# Patient Record
Sex: FEMALE | Race: WHITE | NOT HISPANIC OR LATINO
[De-identification: names, ages, dates, MRNs, and addresses within clinical notes are randomized per-mention and may not be internally consistent; named-entity substitution may affect disease eponyms.]

---

## 2017-07-18 ENCOUNTER — APPOINTMENT (OUTPATIENT)
Dept: ORTHOPEDIC SURGERY | Facility: CLINIC | Age: 37
End: 2017-07-18

## 2017-07-19 ENCOUNTER — TRANSCRIPTION ENCOUNTER (OUTPATIENT)
Age: 37
End: 2017-07-19

## 2017-07-27 ENCOUNTER — TRANSCRIPTION ENCOUNTER (OUTPATIENT)
Age: 37
End: 2017-07-27

## 2017-08-11 ENCOUNTER — CLINICAL ADVICE (OUTPATIENT)
Age: 37
End: 2017-08-11

## 2017-08-11 ENCOUNTER — APPOINTMENT (OUTPATIENT)
Dept: ORTHOPEDIC SURGERY | Facility: CLINIC | Age: 37
End: 2017-08-11

## 2017-08-23 ENCOUNTER — TRANSCRIPTION ENCOUNTER (OUTPATIENT)
Age: 37
End: 2017-08-23

## 2017-08-29 ENCOUNTER — APPOINTMENT (OUTPATIENT)
Dept: ORTHOPEDIC SURGERY | Facility: CLINIC | Age: 37
End: 2017-08-29
Payer: COMMERCIAL

## 2017-08-29 PROCEDURE — 20605 DRAIN/INJ JOINT/BURSA W/O US: CPT | Mod: F5

## 2017-08-29 PROCEDURE — 99214 OFFICE O/P EST MOD 30 MIN: CPT | Mod: 25

## 2017-08-29 PROCEDURE — 73140 X-RAY EXAM OF FINGER(S): CPT | Mod: F5

## 2017-12-07 ENCOUNTER — APPOINTMENT (OUTPATIENT)
Dept: ORTHOPEDIC SURGERY | Facility: CLINIC | Age: 37
End: 2017-12-07
Payer: COMMERCIAL

## 2017-12-07 DIAGNOSIS — T14.8XXA OTHER INJURY OF UNSPECIFIED BODY REGION, INITIAL ENCOUNTER: ICD-10-CM

## 2017-12-07 PROCEDURE — 99214 OFFICE O/P EST MOD 30 MIN: CPT

## 2018-02-13 ENCOUNTER — APPOINTMENT (OUTPATIENT)
Dept: ORTHOPEDIC SURGERY | Facility: CLINIC | Age: 38
End: 2018-02-13
Payer: COMMERCIAL

## 2018-02-13 VITALS — WEIGHT: 130 LBS | RESPIRATION RATE: 16 BRPM | HEIGHT: 62 IN | BODY MASS INDEX: 23.92 KG/M2

## 2018-02-13 PROCEDURE — 99214 OFFICE O/P EST MOD 30 MIN: CPT

## 2018-04-06 ENCOUNTER — RESULT REVIEW (OUTPATIENT)
Age: 38
End: 2018-04-06

## 2018-06-08 ENCOUNTER — OUTPATIENT (OUTPATIENT)
Dept: OUTPATIENT SERVICES | Facility: HOSPITAL | Age: 38
LOS: 1 days | End: 2018-06-08
Payer: COMMERCIAL

## 2018-06-08 DIAGNOSIS — O26.899 OTHER SPECIFIED PREGNANCY RELATED CONDITIONS, UNSPECIFIED TRIMESTER: ICD-10-CM

## 2018-06-08 DIAGNOSIS — Z3A.00 WEEKS OF GESTATION OF PREGNANCY NOT SPECIFIED: ICD-10-CM

## 2018-06-08 PROCEDURE — 76818 FETAL BIOPHYS PROFILE W/NST: CPT

## 2018-06-08 PROCEDURE — 99214 OFFICE O/P EST MOD 30 MIN: CPT

## 2018-06-11 DIAGNOSIS — O09.513 SUPERVISION OF ELDERLY PRIMIGRAVIDA, THIRD TRIMESTER: ICD-10-CM

## 2018-06-13 ENCOUNTER — OUTPATIENT (OUTPATIENT)
Dept: OUTPATIENT SERVICES | Facility: HOSPITAL | Age: 38
LOS: 1 days | End: 2018-06-13
Payer: COMMERCIAL

## 2018-06-13 DIAGNOSIS — Z01.818 ENCOUNTER FOR OTHER PREPROCEDURAL EXAMINATION: ICD-10-CM

## 2018-06-13 LAB
BLD GP AB SCN SERPL QL: NEGATIVE — SIGNIFICANT CHANGE UP
BLD GP AB SCN SERPL QL: NEGATIVE — SIGNIFICANT CHANGE UP
HCT VFR BLD CALC: 35.6 % — SIGNIFICANT CHANGE UP (ref 34.5–45)
HGB BLD-MCNC: 12.3 G/DL — SIGNIFICANT CHANGE UP (ref 11.5–15.5)
MCHC RBC-ENTMCNC: 30.8 PG — SIGNIFICANT CHANGE UP (ref 27–34)
MCHC RBC-ENTMCNC: 34.6 G/DL — SIGNIFICANT CHANGE UP (ref 32–36)
MCV RBC AUTO: 89 FL — SIGNIFICANT CHANGE UP (ref 80–100)
PLATELET # BLD AUTO: 189 K/UL — SIGNIFICANT CHANGE UP (ref 150–400)
RBC # BLD: 4 M/UL — SIGNIFICANT CHANGE UP (ref 3.8–5.2)
RBC # FLD: 13.4 % — SIGNIFICANT CHANGE UP (ref 10.3–16.9)
RH IG SCN BLD-IMP: POSITIVE — SIGNIFICANT CHANGE UP
RH IG SCN BLD-IMP: POSITIVE — SIGNIFICANT CHANGE UP
WBC # BLD: 12.4 K/UL — HIGH (ref 3.8–10.5)
WBC # FLD AUTO: 12.4 K/UL — HIGH (ref 3.8–10.5)

## 2018-06-13 PROCEDURE — 86901 BLOOD TYPING SEROLOGIC RH(D): CPT

## 2018-06-13 PROCEDURE — 86900 BLOOD TYPING SEROLOGIC ABO: CPT

## 2018-06-13 PROCEDURE — 86850 RBC ANTIBODY SCREEN: CPT

## 2018-06-13 PROCEDURE — 85027 COMPLETE CBC AUTOMATED: CPT

## 2018-06-13 PROCEDURE — 86780 TREPONEMA PALLIDUM: CPT

## 2018-06-14 ENCOUNTER — INPATIENT (INPATIENT)
Facility: HOSPITAL | Age: 38
LOS: 3 days | Discharge: HOME | End: 2018-06-18
Attending: OBSTETRICS & GYNECOLOGY | Admitting: OBSTETRICS & GYNECOLOGY
Payer: COMMERCIAL

## 2018-06-14 ENCOUNTER — RESULT REVIEW (OUTPATIENT)
Age: 38
End: 2018-06-14

## 2018-06-14 VITALS — HEIGHT: 63 IN | WEIGHT: 154.98 LBS

## 2018-06-14 LAB — T PALLIDUM AB TITR SER: NEGATIVE — SIGNIFICANT CHANGE UP

## 2018-06-14 RX ORDER — IBUPROFEN 200 MG
600 TABLET ORAL ONCE
Qty: 0 | Refills: 0 | Status: DISCONTINUED | OUTPATIENT
Start: 2018-06-14 | End: 2018-06-18

## 2018-06-14 RX ORDER — ACETAMINOPHEN 500 MG
650 TABLET ORAL EVERY 6 HOURS
Qty: 0 | Refills: 0 | Status: DISCONTINUED | OUTPATIENT
Start: 2018-06-14 | End: 2018-06-18

## 2018-06-14 RX ORDER — ACETAMINOPHEN 500 MG
650 TABLET ORAL EVERY 6 HOURS
Qty: 0 | Refills: 0 | Status: DISCONTINUED | OUTPATIENT
Start: 2018-06-14 | End: 2018-06-15

## 2018-06-14 RX ORDER — SODIUM CHLORIDE 9 MG/ML
1000 INJECTION, SOLUTION INTRAVENOUS
Qty: 0 | Refills: 0 | Status: DISCONTINUED | OUTPATIENT
Start: 2018-06-14 | End: 2018-06-14

## 2018-06-14 RX ORDER — NALOXONE HYDROCHLORIDE 4 MG/.1ML
0.1 SPRAY NASAL
Qty: 0 | Refills: 0 | Status: DISCONTINUED | OUTPATIENT
Start: 2018-06-14 | End: 2018-06-15

## 2018-06-14 RX ORDER — OXYCODONE AND ACETAMINOPHEN 5; 325 MG/1; MG/1
2 TABLET ORAL EVERY 6 HOURS
Qty: 0 | Refills: 0 | Status: DISCONTINUED | OUTPATIENT
Start: 2018-06-14 | End: 2018-06-18

## 2018-06-14 RX ORDER — OXYCODONE AND ACETAMINOPHEN 5; 325 MG/1; MG/1
1 TABLET ORAL
Qty: 0 | Refills: 0 | Status: DISCONTINUED | OUTPATIENT
Start: 2018-06-14 | End: 2018-06-18

## 2018-06-14 RX ORDER — SIMETHICONE 80 MG/1
80 TABLET, CHEWABLE ORAL EVERY 4 HOURS
Qty: 0 | Refills: 0 | Status: DISCONTINUED | OUTPATIENT
Start: 2018-06-14 | End: 2018-06-18

## 2018-06-14 RX ORDER — DIPHENHYDRAMINE HCL 50 MG
25 CAPSULE ORAL EVERY 6 HOURS
Qty: 0 | Refills: 0 | Status: DISCONTINUED | OUTPATIENT
Start: 2018-06-14 | End: 2018-06-15

## 2018-06-14 RX ORDER — OXYTOCIN 10 UNIT/ML
41.67 VIAL (ML) INJECTION
Qty: 20 | Refills: 0 | Status: DISCONTINUED | OUTPATIENT
Start: 2018-06-14 | End: 2018-06-14

## 2018-06-14 RX ORDER — METOCLOPRAMIDE HCL 10 MG
10 TABLET ORAL ONCE
Qty: 0 | Refills: 0 | Status: DISCONTINUED | OUTPATIENT
Start: 2018-06-14 | End: 2018-06-14

## 2018-06-14 RX ORDER — TETANUS TOXOID, REDUCED DIPHTHERIA TOXOID AND ACELLULAR PERTUSSIS VACCINE, ADSORBED 5; 2.5; 8; 8; 2.5 [IU]/.5ML; [IU]/.5ML; UG/.5ML; UG/.5ML; UG/.5ML
0.5 SUSPENSION INTRAMUSCULAR ONCE
Qty: 0 | Refills: 0 | Status: DISCONTINUED | OUTPATIENT
Start: 2018-06-14 | End: 2018-06-18

## 2018-06-14 RX ORDER — OXYTOCIN 10 UNIT/ML
41.67 VIAL (ML) INJECTION
Qty: 20 | Refills: 0 | Status: DISCONTINUED | OUTPATIENT
Start: 2018-06-14 | End: 2018-06-18

## 2018-06-14 RX ORDER — GLYCERIN ADULT
1 SUPPOSITORY, RECTAL RECTAL AT BEDTIME
Qty: 0 | Refills: 0 | Status: DISCONTINUED | OUTPATIENT
Start: 2018-06-14 | End: 2018-06-15

## 2018-06-14 RX ORDER — FERROUS SULFATE 325(65) MG
325 TABLET ORAL DAILY
Qty: 0 | Refills: 0 | Status: DISCONTINUED | OUTPATIENT
Start: 2018-06-14 | End: 2018-06-18

## 2018-06-14 RX ORDER — SODIUM CHLORIDE 9 MG/ML
1000 INJECTION, SOLUTION INTRAVENOUS
Qty: 0 | Refills: 0 | Status: DISCONTINUED | OUTPATIENT
Start: 2018-06-14 | End: 2018-06-15

## 2018-06-14 RX ORDER — IBUPROFEN 200 MG
600 TABLET ORAL EVERY 6 HOURS
Qty: 0 | Refills: 0 | Status: DISCONTINUED | OUTPATIENT
Start: 2018-06-14 | End: 2018-06-18

## 2018-06-14 RX ORDER — CITRIC ACID/SODIUM CITRATE 300-500 MG
30 SOLUTION, ORAL ORAL ONCE
Qty: 0 | Refills: 0 | Status: COMPLETED | OUTPATIENT
Start: 2018-06-14 | End: 2018-06-14

## 2018-06-14 RX ORDER — CEFAZOLIN SODIUM 1 G
2000 VIAL (EA) INJECTION ONCE
Qty: 0 | Refills: 0 | Status: COMPLETED | OUTPATIENT
Start: 2018-06-14 | End: 2018-06-14

## 2018-06-14 RX ORDER — SODIUM CHLORIDE 9 MG/ML
1000 INJECTION, SOLUTION INTRAVENOUS ONCE
Qty: 0 | Refills: 0 | Status: COMPLETED | OUTPATIENT
Start: 2018-06-14 | End: 2018-06-14

## 2018-06-14 RX ORDER — ONDANSETRON 8 MG/1
4 TABLET, FILM COATED ORAL EVERY 6 HOURS
Qty: 0 | Refills: 0 | Status: DISCONTINUED | OUTPATIENT
Start: 2018-06-14 | End: 2018-06-18

## 2018-06-14 RX ORDER — LANOLIN
1 OINTMENT (GRAM) TOPICAL
Qty: 0 | Refills: 0 | Status: DISCONTINUED | OUTPATIENT
Start: 2018-06-14 | End: 2018-06-18

## 2018-06-14 RX ORDER — DOCUSATE SODIUM 100 MG
100 CAPSULE ORAL
Qty: 0 | Refills: 0 | Status: DISCONTINUED | OUTPATIENT
Start: 2018-06-14 | End: 2018-06-18

## 2018-06-14 RX ORDER — OXYTOCIN 10 UNIT/ML
333.33 VIAL (ML) INJECTION
Qty: 20 | Refills: 0 | Status: COMPLETED | OUTPATIENT
Start: 2018-06-14 | End: 2018-06-14

## 2018-06-14 RX ADMIN — Medication 100 MILLIGRAM(S): at 10:09

## 2018-06-14 RX ADMIN — Medication 600 MILLIGRAM(S): at 18:23

## 2018-06-14 RX ADMIN — SODIUM CHLORIDE 125 MILLILITER(S): 9 INJECTION, SOLUTION INTRAVENOUS at 09:07

## 2018-06-14 RX ADMIN — Medication 125 MILLIUNIT(S)/MIN: at 12:43

## 2018-06-14 RX ADMIN — Medication 1000 MILLIUNIT(S)/MIN: at 12:43

## 2018-06-14 RX ADMIN — ONDANSETRON 4 MILLIGRAM(S): 8 TABLET, FILM COATED ORAL at 16:25

## 2018-06-14 RX ADMIN — Medication 125 MILLIUNIT(S)/MIN: at 13:18

## 2018-06-14 RX ADMIN — Medication 600 MILLIGRAM(S): at 20:15

## 2018-06-14 RX ADMIN — SODIUM CHLORIDE 2000 MILLILITER(S): 9 INJECTION, SOLUTION INTRAVENOUS at 08:30

## 2018-06-14 RX ADMIN — Medication 30 MILLILITER(S): at 10:09

## 2018-06-14 NOTE — PATIENT PROFILE OB - WEIGHT: TOTAL WEIGHT IN LBS
With dad, Al.  Was in hockey yesterday and either pulled his right knee or hyperextended it last night. He said it is bruised and hurts to straighten out but he is able to. He is taking Advil for it.    30

## 2018-06-15 LAB
BASOPHILS NFR BLD AUTO: 0.1 % — SIGNIFICANT CHANGE UP (ref 0–2)
EOSINOPHIL NFR BLD AUTO: 0.1 % — SIGNIFICANT CHANGE UP (ref 0–6)
HCT VFR BLD CALC: 28.3 % — LOW (ref 34.5–45)
HGB BLD-MCNC: 9.7 G/DL — LOW (ref 11.5–15.5)
LYMPHOCYTES # BLD AUTO: 12.5 % — LOW (ref 13–44)
MCHC RBC-ENTMCNC: 29.9 PG — SIGNIFICANT CHANGE UP (ref 27–34)
MCHC RBC-ENTMCNC: 34.3 G/DL — SIGNIFICANT CHANGE UP (ref 32–36)
MCV RBC AUTO: 87.3 FL — SIGNIFICANT CHANGE UP (ref 80–100)
MONOCYTES NFR BLD AUTO: 5.4 % — SIGNIFICANT CHANGE UP (ref 2–14)
NEUTROPHILS NFR BLD AUTO: 81.9 % — HIGH (ref 43–77)
PLATELET # BLD AUTO: 158 K/UL — SIGNIFICANT CHANGE UP (ref 150–400)
RBC # BLD: 3.24 M/UL — LOW (ref 3.8–5.2)
RBC # FLD: 13.1 % — SIGNIFICANT CHANGE UP (ref 10.3–16.9)
WBC # BLD: 19 K/UL — HIGH (ref 3.8–10.5)
WBC # FLD AUTO: 19 K/UL — HIGH (ref 3.8–10.5)

## 2018-06-15 RX ORDER — HEPARIN SODIUM 5000 [USP'U]/ML
5000 INJECTION INTRAVENOUS; SUBCUTANEOUS EVERY 12 HOURS
Qty: 0 | Refills: 0 | Status: DISCONTINUED | OUTPATIENT
Start: 2018-06-15 | End: 2018-06-18

## 2018-06-15 RX ADMIN — Medication 600 MILLIGRAM(S): at 06:30

## 2018-06-15 RX ADMIN — HEPARIN SODIUM 5000 UNIT(S): 5000 INJECTION INTRAVENOUS; SUBCUTANEOUS at 18:15

## 2018-06-15 RX ADMIN — SIMETHICONE 80 MILLIGRAM(S): 80 TABLET, CHEWABLE ORAL at 23:57

## 2018-06-15 RX ADMIN — Medication 100 MILLIGRAM(S): at 06:30

## 2018-06-15 RX ADMIN — Medication 600 MILLIGRAM(S): at 23:58

## 2018-06-15 RX ADMIN — Medication 600 MILLIGRAM(S): at 18:15

## 2018-06-15 RX ADMIN — HEPARIN SODIUM 5000 UNIT(S): 5000 INJECTION INTRAVENOUS; SUBCUTANEOUS at 06:30

## 2018-06-15 RX ADMIN — Medication 600 MILLIGRAM(S): at 01:20

## 2018-06-15 RX ADMIN — Medication 600 MILLIGRAM(S): at 13:15

## 2018-06-15 RX ADMIN — Medication 325 MILLIGRAM(S): at 12:53

## 2018-06-15 RX ADMIN — SIMETHICONE 80 MILLIGRAM(S): 80 TABLET, CHEWABLE ORAL at 08:05

## 2018-06-15 RX ADMIN — Medication 600 MILLIGRAM(S): at 00:27

## 2018-06-15 RX ADMIN — Medication 1 TABLET(S): at 12:53

## 2018-06-15 RX ADMIN — Medication 650 MILLIGRAM(S): at 21:46

## 2018-06-15 RX ADMIN — SIMETHICONE 80 MILLIGRAM(S): 80 TABLET, CHEWABLE ORAL at 18:15

## 2018-06-15 RX ADMIN — Medication 600 MILLIGRAM(S): at 18:45

## 2018-06-15 RX ADMIN — SIMETHICONE 80 MILLIGRAM(S): 80 TABLET, CHEWABLE ORAL at 00:28

## 2018-06-15 RX ADMIN — Medication 650 MILLIGRAM(S): at 22:30

## 2018-06-15 RX ADMIN — Medication 600 MILLIGRAM(S): at 07:36

## 2018-06-15 RX ADMIN — Medication 600 MILLIGRAM(S): at 12:53

## 2018-06-15 RX ADMIN — Medication 100 MILLIGRAM(S): at 18:14

## 2018-06-15 NOTE — PROGRESS NOTE ADULT - SUBJECTIVE AND OBJECTIVE BOX
Patient evaluated at bedside.   She reports pain is well controlled.  She denies nausea, vomiting or heavy vaginal bleeding.  She has not been OOB, wood catheter is in place, however she is passing gas, tolerating clear diet and is breastfeeding.    Physical Exam:  Vital Signs Last 24 Hrs  T(C): 36.4 (15 Dominic 2018 06:29), Max: 37.1 (15 Dominic 2018 02:07)  T(F): 97.6 (15 Dominic 2018 06:29), Max: 98.7 (15 Dominic 2018 02:07)  HR: 75 (15 Dominic 2018 06:29) (66 - 87)  BP: 113/55 (15 Dominic 2018 06:29) (104/57 - 129/78)  BP(mean): --  RR: 18 (15 Dominic 2018 06:29) (18 - 20)  SpO2: 96% (15 Dominic 2018 06:29) (96% - 99%)    GA: NAD, A+0 x 3  Abd: + BS, soft, nontender, nondistended, no rebound or guarding, incision clean, dry and intact, pressure dressing removed, steristrips in place, uterus firm at midline, 3 fb below umbilicus  : lochia WNL  Wood: clear urine with adequate output  Extremities: no swelling or calf tenderness                            9.7    19.0  )-----------( 158      ( 15 Dominic 2018 06:11 )             28.3

## 2018-06-15 NOTE — PROGRESS NOTE ADULT - ASSESSMENT
A/P   37y  s/p c/s, POD #1, stable  -  Pain: PO motrin, percocet  -  GI: tolerating clears, passing gas, ADAT  -  : wood in situ; DC this AM, f/u TOV  -  DVT prophylaxis: ambulation, SCDs  -  Dispo: POD 3 or 4

## 2018-06-15 NOTE — LACTATION INITIAL EVALUATION - REQUESTED BY
lactation rounds, initial visit.  delivered @ 395/7 weeks gestation via c/s. The  has had a 2.9% weight loss since delivery and has had 6 voids and 6 stools since birth. The mother's nurse reported that the  is breast feeding well. The  was ~ 26 hours old when I consulted @ the mother's bedside. The mother was assisted to latch the  to the right breast in the cross cradle hold. The  latched on with a deep latch, rhythmic sucking and swallowing was observed and the  breast fed for 10 minutes and fell asleep. The mother c/o sore nipples and her left nipple is cracked and healing. Management of sore nipples and the importance of a deep latch and how to obtain a deep latch was reviewed with the mother. Breast feeding guidelines, positioning the  to the breast, latching strategies, early feeding cues, hand expression and 's output requirements reviewed with the mother. Written literature was given to the mother on the above. S2S was encouraged. All questions were answered and the mother verbalized understanding. I encouraged the mother to ask for lactation assistance as needed.

## 2018-06-16 RX ADMIN — Medication 325 MILLIGRAM(S): at 12:02

## 2018-06-16 RX ADMIN — Medication 600 MILLIGRAM(S): at 18:04

## 2018-06-16 RX ADMIN — Medication 1 TABLET(S): at 12:02

## 2018-06-16 RX ADMIN — HEPARIN SODIUM 5000 UNIT(S): 5000 INJECTION INTRAVENOUS; SUBCUTANEOUS at 18:04

## 2018-06-16 RX ADMIN — Medication 600 MILLIGRAM(S): at 06:40

## 2018-06-16 RX ADMIN — Medication 600 MILLIGRAM(S): at 12:02

## 2018-06-16 RX ADMIN — Medication 100 MILLIGRAM(S): at 12:02

## 2018-06-16 RX ADMIN — HEPARIN SODIUM 5000 UNIT(S): 5000 INJECTION INTRAVENOUS; SUBCUTANEOUS at 06:07

## 2018-06-16 RX ADMIN — Medication 600 MILLIGRAM(S): at 00:55

## 2018-06-16 RX ADMIN — Medication 600 MILLIGRAM(S): at 06:11

## 2018-06-16 RX ADMIN — Medication 600 MILLIGRAM(S): at 12:30

## 2018-06-16 RX ADMIN — Medication 600 MILLIGRAM(S): at 18:28

## 2018-06-16 NOTE — PROGRESS NOTE ADULT - SUBJECTIVE AND OBJECTIVE BOX
Patient evaluated at bedside.   She reports pain is well controlled.  She denies headache, dizziness, chest pain, palpitations, shortness of breathe, nausea, vomiting or heavy vaginal bleeding.  She has been ambulating without assistance, voiding spontaneously, passing gas, tolerating regular diet and is breastfeeding.    Physical Exam:  Vital Signs Last 24 Hrs  T(C): 36.9 (16 Jun 2018 02:03), Max: 37.2 (15 Dominic 2018 22:37)  T(F): 98.5 (16 Jun 2018 02:03), Max: 99 (15 Dominic 2018 22:37)  HR: 79 (16 Jun 2018 02:03) (79 - 92)  BP: 112/71 (16 Jun 2018 02:03) (108/78 - 114/71)  BP(mean): --  RR: 17 (16 Jun 2018 02:03) (16 - 18)  SpO2: 98% (16 Jun 2018 02:03) (98% - 99%)    06-15 @ 07:01  -  06-16 @ 07:00  --------------------------------------------------------  IN: 0 mL / OUT: 1100 mL / NET: -1100 mL        GA: NAD, A+0 x 3  CV: RRR  Pulm: Normal work of breathing  Breasts: soft, nontender, no palpable masses  Abd: + BS, soft, nontender, nondistended, no rebound or guarding, uterus firm at midline,  fundus below umbilicus  Incision: well approximated, no erythema or discharge  : lochia WNL  Extremities: no swelling or calf tenderness                            9.7    19.0  )-----------( 158      ( 15 Dominic 2018 06:11 )             28.3

## 2018-06-16 NOTE — PROGRESS NOTE ADULT - ATTENDING COMMENTS
Doing well - +flatus. Needs to ambulate more in hallways discussed with pt.   Tolerating reg diet, pain controlled.  Discharge home tomorrow or POD 4 if she desires.

## 2018-06-16 NOTE — PROGRESS NOTE ADULT - ASSESSMENT
37y Female POD#2    s/p C/S, Uncomplicated                                       1. Neuro/Pain:  OPM  2  CV:  VS per routine  3. Pulm: Encourage ISS & Ambulation  4. GI:  Reg  5. : Voiding  6. DVT ppx: SCDs, SQH 5000 mg BID  7. Dispo: POD #3 or #4

## 2018-06-17 RX ADMIN — Medication 100 MILLIGRAM(S): at 00:29

## 2018-06-17 RX ADMIN — Medication 600 MILLIGRAM(S): at 01:30

## 2018-06-17 RX ADMIN — Medication 600 MILLIGRAM(S): at 12:01

## 2018-06-17 RX ADMIN — Medication 100 MILLIGRAM(S): at 12:01

## 2018-06-17 RX ADMIN — Medication 600 MILLIGRAM(S): at 18:38

## 2018-06-17 RX ADMIN — Medication 650 MILLIGRAM(S): at 03:49

## 2018-06-17 RX ADMIN — HEPARIN SODIUM 5000 UNIT(S): 5000 INJECTION INTRAVENOUS; SUBCUTANEOUS at 06:20

## 2018-06-17 RX ADMIN — Medication 600 MILLIGRAM(S): at 18:02

## 2018-06-17 RX ADMIN — Medication 600 MILLIGRAM(S): at 12:44

## 2018-06-17 RX ADMIN — Medication 600 MILLIGRAM(S): at 06:23

## 2018-06-17 RX ADMIN — Medication 600 MILLIGRAM(S): at 00:29

## 2018-06-17 RX ADMIN — HEPARIN SODIUM 5000 UNIT(S): 5000 INJECTION INTRAVENOUS; SUBCUTANEOUS at 18:02

## 2018-06-17 RX ADMIN — Medication 325 MILLIGRAM(S): at 12:01

## 2018-06-17 RX ADMIN — Medication 600 MILLIGRAM(S): at 07:15

## 2018-06-17 RX ADMIN — Medication 600 MILLIGRAM(S): at 23:41

## 2018-06-17 RX ADMIN — Medication 1 TABLET(S): at 12:01

## 2018-06-17 RX ADMIN — SIMETHICONE 80 MILLIGRAM(S): 80 TABLET, CHEWABLE ORAL at 03:52

## 2018-06-17 RX ADMIN — SIMETHICONE 80 MILLIGRAM(S): 80 TABLET, CHEWABLE ORAL at 12:01

## 2018-06-17 RX ADMIN — Medication 650 MILLIGRAM(S): at 04:40

## 2018-06-17 NOTE — PROGRESS NOTE ADULT - SUBJECTIVE AND OBJECTIVE BOX
Patient evaluated at bedside.   She reports pain is well controlled.  She denies nausea, vomiting or heavy vaginal bleeding.  She has been ambulating without assistance, voiding spontaneously, passing gas, tolerating regular diet and is breastfeeding.    Physical Exam:  Vital Signs Last 24 Hrs  T(C): 36.7 (17 Jun 2018 06:00), Max: 36.9 (16 Jun 2018 08:30)  T(F): 98.1 (17 Jun 2018 06:00), Max: 98.4 (16 Jun 2018 08:30)  HR: 68 (17 Jun 2018 06:00) (68 - 88)  BP: 114/76 (17 Jun 2018 06:00) (114/76 - 130/79)  BP(mean): --  RR: 18 (17 Jun 2018 06:00) (17 - 18)  SpO2: 99% (17 Jun 2018 06:00) (96% - 99%)    GA: NAD, A+0 x 3  Abd: + BS, soft, nontender, nondistended, no rebound or guarding, incision clean, dry and intact, pressure dressing removed, steristrips in place, uterus firm at midline, 3 fb below umbilicus  : lochia WNL  Extremities: no swelling or calf tenderness

## 2018-06-18 ENCOUNTER — TRANSCRIPTION ENCOUNTER (OUTPATIENT)
Age: 38
End: 2018-06-18

## 2018-06-18 VITALS
SYSTOLIC BLOOD PRESSURE: 129 MMHG | DIASTOLIC BLOOD PRESSURE: 81 MMHG | HEART RATE: 77 BPM | TEMPERATURE: 98 F | OXYGEN SATURATION: 99 % | RESPIRATION RATE: 18 BRPM

## 2018-06-18 PROCEDURE — 36415 COLL VENOUS BLD VENIPUNCTURE: CPT

## 2018-06-18 PROCEDURE — 85025 COMPLETE CBC W/AUTO DIFF WBC: CPT

## 2018-06-18 PROCEDURE — 88307 TISSUE EXAM BY PATHOLOGIST: CPT

## 2018-06-18 RX ORDER — IBUPROFEN 200 MG
1 TABLET ORAL
Qty: 0 | Refills: 0 | COMMUNITY
Start: 2018-06-18

## 2018-06-18 RX ADMIN — Medication 100 MILLIGRAM(S): at 12:28

## 2018-06-18 RX ADMIN — Medication 600 MILLIGRAM(S): at 05:00

## 2018-06-18 RX ADMIN — Medication 600 MILLIGRAM(S): at 12:28

## 2018-06-18 RX ADMIN — Medication 325 MILLIGRAM(S): at 12:28

## 2018-06-18 RX ADMIN — Medication 600 MILLIGRAM(S): at 13:20

## 2018-06-18 RX ADMIN — HEPARIN SODIUM 5000 UNIT(S): 5000 INJECTION INTRAVENOUS; SUBCUTANEOUS at 05:02

## 2018-06-18 RX ADMIN — Medication 1 TABLET(S): at 12:28

## 2018-06-18 RX ADMIN — Medication 600 MILLIGRAM(S): at 06:00

## 2018-06-18 RX ADMIN — Medication 600 MILLIGRAM(S): at 00:30

## 2018-06-18 NOTE — DISCHARGE NOTE OB - MEDICATION SUMMARY - MEDICATIONS TO TAKE
I will START or STAY ON the medications listed below when I get home from the hospital:    ibuprofen 600 mg oral tablet  -- 1 tab(s) by mouth every 6 hours, As Needed for pain  -- Indication: For Pain    Prenatal 1 oral capsule  -- Indication: For Postpartum state

## 2018-06-18 NOTE — DISCHARGE NOTE OB - CARE PLAN
Principal Discharge DX:	Postpartum state  Goal:	postpartum care  Assessment and plan of treatment:	37 year old female s/p  section, post operative day 4, meeting all postpartum milestones. No heavy lifting. Pelvic rest until cleared. Follow-up with obstetrician in 1-2 weeks.

## 2018-06-18 NOTE — PROGRESS NOTE ADULT - SUBJECTIVE AND OBJECTIVE BOX
S: Patient doing well. +flatus, tolerating regular diet. Pain controlled. Ambulating.     O: Vital Signs Last 24 Hrs  T(C): 36.7 (18 Jun 2018 06:00), Max: 37 (17 Jun 2018 14:00)  T(F): 98.1 (18 Jun 2018 06:00), Max: 98.6 (17 Jun 2018 14:00)  HR: 77 (18 Jun 2018 06:00) (75 - 93)  BP: 129/81 (18 Jun 2018 06:00) (125/76 - 130/81)  BP(mean): --  RR: 18 (18 Jun 2018 06:00) (18 - 18)  SpO2: 99% (18 Jun 2018 06:00) (99% - 100%)    Gen: NAD  Abdomen: soft, NTND, +BS, nontender, firm below umbilicus  Incision: C/D/I  Ext: no calf tenderness      Labs:     A/P: 36yo POD#4 s/p CS doing well  Pain control  Ambulation  Advance diet as tolerated  Routine postop care  discharge home today

## 2018-06-18 NOTE — PROGRESS NOTE ADULT - ASSESSMENT
A/P   37y  s/p c/s, POD #1, stable  -  Pain: PO motrin, percocet  -  GI: reg diet  -  : voids  -  DVT prophylaxis: ambulation, SCDs, SQH  -  Dispo: POD 4

## 2018-06-18 NOTE — DISCHARGE NOTE OB - PATIENT PORTAL LINK FT
You can access the [facility], offered by [facility], by registering with the following website: [email or web address]

## 2018-06-18 NOTE — PROGRESS NOTE ADULT - SUBJECTIVE AND OBJECTIVE BOX
Patient evaluated at bedside.   She reports pain is well controlled.  She denies nausea, vomiting or heavy vaginal bleeding.  She has been ambulating without assistance, voiding spontaneously, passing gas, tolerating regular diet and is breastfeeding.    Physical Exam:  Vital Signs Last 24 Hrs  T(C): 36.7 (18 Jun 2018 06:00), Max: 37 (17 Jun 2018 14:00)  T(F): 98.1 (18 Jun 2018 06:00), Max: 98.6 (17 Jun 2018 14:00)  HR: 77 (18 Jun 2018 06:00) (75 - 93)  BP: 129/81 (18 Jun 2018 06:00) (125/76 - 130/81)  BP(mean): --  RR: 18 (18 Jun 2018 06:00) (18 - 18)  SpO2: 99% (18 Jun 2018 06:00) (99% - 100%)    GA: NAD, A+0 x 3  Abd: + BS, soft, nontender, nondistended, no rebound or guarding, incision clean, dry and intact, pressure dressing removed, steristrips in place, uterus firm at midline, 3 fb below umbilicus  : lochia WNL  Extremities: no swelling or calf tenderness

## 2018-06-18 NOTE — DISCHARGE NOTE OB - PLAN OF CARE
postpartum care 37 year old female s/p  section, post operative day 4, meeting all postpartum milestones. No heavy lifting. Pelvic rest until cleared. Follow-up with obstetrician in 1-2 weeks.

## 2018-06-18 NOTE — DISCHARGE NOTE OB - CARE PROVIDER_API CALL
Jeny Carey), Obstetrics and Gynecology  [address]  [address] [place] [address]  Phone: [phone]  Fax: [phone]

## 2018-06-21 DIAGNOSIS — Z3A.39 39 WEEKS GESTATION OF PREGNANCY: ICD-10-CM

## 2018-06-21 DIAGNOSIS — Z34.80 ENCOUNTER FOR SUPERVISION OF OTHER NORMAL PREGNANCY, UNSPECIFIED TRIMESTER: ICD-10-CM

## 2018-06-21 DIAGNOSIS — Z88.2 ALLERGY STATUS TO SULFONAMIDES: ICD-10-CM

## 2018-06-21 DIAGNOSIS — Z91.040 LATEX ALLERGY STATUS: ICD-10-CM

## 2018-06-21 DIAGNOSIS — Z88.1 ALLERGY STATUS TO OTHER ANTIBIOTIC AGENTS STATUS: ICD-10-CM

## 2018-06-22 LAB
SURGICAL PATHOLOGY STUDY: SIGNIFICANT CHANGE UP
SURGICAL PATHOLOGY STUDY: SIGNIFICANT CHANGE UP

## 2018-07-25 ENCOUNTER — TRANSCRIPTION ENCOUNTER (OUTPATIENT)
Age: 38
End: 2018-07-25

## 2018-10-17 ENCOUNTER — APPOINTMENT (OUTPATIENT)
Dept: ORTHOPEDIC SURGERY | Facility: CLINIC | Age: 38
End: 2018-10-17
Payer: COMMERCIAL

## 2018-10-17 DIAGNOSIS — M79.644 PAIN IN RIGHT FINGER(S): ICD-10-CM

## 2018-10-17 PROCEDURE — 99214 OFFICE O/P EST MOD 30 MIN: CPT

## 2020-09-30 NOTE — DISCHARGE NOTE OB - PROVIDER RX CONTACT NUMBER
Orders for hospital bed fax to [facility]  [facility] @ [phone]

## 2021-02-22 ENCOUNTER — TRANSCRIPTION ENCOUNTER (OUTPATIENT)
Age: 41
End: 2021-02-22

## 2022-02-08 NOTE — PATIENT PROFILE OB - HOW PATIENT ADDRESSED, OB PROFILE
Detail Level: Generalized
Render Risk Assessment In Note?: no
Additional Notes: *Patient to discuss with hairdresser to monitor scalp for any new or changing skin lesions.\\n*Perform skin self-checks with significant other for new or changing lesions.  Photographic documentation is encouraged.\\n\\nPatient consent was obtained to proceed with the visit and recommended plan of care after discussion of all risks and benefits, including the risks of COVID-19 exposure.
Shima

## 2022-08-08 ENCOUNTER — APPOINTMENT (RX ONLY)
Dept: URBAN - METROPOLITAN AREA CLINIC 151 | Facility: CLINIC | Age: 42
Setting detail: DERMATOLOGY
End: 2022-08-08

## 2022-08-08 DIAGNOSIS — D22 MELANOCYTIC NEVI: ICD-10-CM

## 2022-08-08 DIAGNOSIS — R20.2 PARESTHESIA OF SKIN: ICD-10-CM

## 2022-08-08 PROBLEM — D22.5 MELANOCYTIC NEVI OF TRUNK: Status: ACTIVE | Noted: 2022-08-08

## 2022-08-08 PROBLEM — D22.71 MELANOCYTIC NEVI OF RIGHT LOWER LIMB, INCLUDING HIP: Status: ACTIVE | Noted: 2022-08-08

## 2022-08-08 PROBLEM — D23.5 OTHER BENIGN NEOPLASM OF SKIN OF TRUNK: Status: ACTIVE | Noted: 2022-08-08

## 2022-08-08 PROCEDURE — ? COUNSELING

## 2022-08-08 PROCEDURE — ? DIAGNOSIS COMMENT

## 2022-08-08 PROCEDURE — 99203 OFFICE O/P NEW LOW 30 MIN: CPT

## 2022-08-08 PROCEDURE — ? PRESCRIPTION MEDICATION MANAGEMENT

## 2022-08-08 PROCEDURE — ? OBSERVATION AND MEASURE

## 2022-08-08 ASSESSMENT — LOCATION DETAILED DESCRIPTION DERM
LOCATION DETAILED: RIGHT MEDIAL UPPER BACK
LOCATION DETAILED: RIGHT MEDIAL POPLITEAL SKIN
LOCATION DETAILED: RIGHT MEDIAL BREAST 3-4:00 REGION
LOCATION DETAILED: SUPERIOR THORACIC SPINE

## 2022-08-08 ASSESSMENT — LOCATION SIMPLE DESCRIPTION DERM
LOCATION SIMPLE: RIGHT BREAST
LOCATION SIMPLE: RIGHT UPPER BACK
LOCATION SIMPLE: UPPER BACK
LOCATION SIMPLE: RIGHT POPLITEAL SKIN

## 2022-08-08 ASSESSMENT — LOCATION ZONE DERM
LOCATION ZONE: TRUNK
LOCATION ZONE: LEG

## 2022-08-08 NOTE — PROCEDURE: OBSERVATION
Detail Level: Detailed
Size Of Lesion: 2mm
Morphology Per Location (Optional): Brown macule
Instructions (Optional): Has been present for whole life per pt
Size Of Lesion: 1mm
Morphology Per Location (Optional): Dark brown macule

## 2022-08-08 NOTE — HPI: SKIN LESIONS
How Severe Is Your Skin Lesion?: mild
Is This A New Presentation, Or A Follow-Up?: Skin Lesions
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

## 2022-08-08 NOTE — PROCEDURE: COUNSELING
Detail Level: Generalized
Patient Specific Counseling (Will Not Stick From Patient To Patient): - Has been ongoing for 3 years per pt hx, has tried numerous topicals- pt to check at home prior to calling in rx so medications are not repeated
Detail Level: Simple

## 2022-08-08 NOTE — PROCEDURE: DIAGNOSIS COMMENT
Render Risk Assessment In Note?: no
Comment: NP, FBSE - Benign findings today. Benign nevi distributed on the body throughout. 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 Observed and measured benign nevi to the RT medial breast and RT popliteal skin for monitoring. Rec’d OTC Urea Cream for the feet.
Detail Level: Generalized

## 2023-08-08 ENCOUNTER — APPOINTMENT (RX ONLY)
Dept: URBAN - METROPOLITAN AREA CLINIC 151 | Facility: CLINIC | Age: 43
Setting detail: DERMATOLOGY
End: 2023-08-08

## 2023-08-08 ENCOUNTER — RX ONLY (OUTPATIENT)
Age: 43
Setting detail: RX ONLY
End: 2023-08-08

## 2023-08-08 DIAGNOSIS — R20.2 PARESTHESIA OF SKIN: ICD-10-CM

## 2023-08-08 DIAGNOSIS — D18.0 HEMANGIOMA: ICD-10-CM

## 2023-08-08 DIAGNOSIS — L20.89 OTHER ATOPIC DERMATITIS: ICD-10-CM

## 2023-08-08 DIAGNOSIS — L81.4 OTHER MELANIN HYPERPIGMENTATION: ICD-10-CM

## 2023-08-08 DIAGNOSIS — L82.1 OTHER SEBORRHEIC KERATOSIS: ICD-10-CM

## 2023-08-08 DIAGNOSIS — D22 MELANOCYTIC NEVI: ICD-10-CM

## 2023-08-08 PROBLEM — D18.01 HEMANGIOMA OF SKIN AND SUBCUTANEOUS TISSUE: Status: ACTIVE | Noted: 2023-08-08

## 2023-08-08 PROBLEM — D22.71 MELANOCYTIC NEVI OF RIGHT LOWER LIMB, INCLUDING HIP: Status: ACTIVE | Noted: 2023-08-08

## 2023-08-08 PROBLEM — D23.5 OTHER BENIGN NEOPLASM OF SKIN OF TRUNK: Status: ACTIVE | Noted: 2023-08-08

## 2023-08-08 PROBLEM — D22.5 MELANOCYTIC NEVI OF TRUNK: Status: ACTIVE | Noted: 2023-08-08

## 2023-08-08 PROCEDURE — ? COUNSELING

## 2023-08-08 PROCEDURE — ? DIAGNOSIS COMMENT

## 2023-08-08 PROCEDURE — ? PRESCRIPTION

## 2023-08-08 PROCEDURE — 99214 OFFICE O/P EST MOD 30 MIN: CPT

## 2023-08-08 PROCEDURE — ? OBSERVATION AND MEASURE

## 2023-08-08 RX ORDER — KETOCONAZOLE 20 MG/ML
SHAMPOO, SUSPENSION TOPICAL
Qty: 120 | Refills: 12 | Status: ERX | COMMUNITY
Start: 2023-08-08

## 2023-08-08 RX ORDER — CLOBETASOL PROPIONATE 0.5 MG/G
CREAM TOPICAL BID PRN
Qty: 30 | Refills: 1 | Status: ERX | COMMUNITY
Start: 2023-08-08

## 2023-08-08 RX ORDER — MOMETASONE FUROATE 1 MG/G
CREAM TOPICAL
Qty: 45 | Refills: 0 | Status: ERX | COMMUNITY
Start: 2023-08-08

## 2023-08-08 RX ADMIN — MOMETASONE FUROATE: 1 CREAM TOPICAL at 00:00

## 2023-08-08 RX ADMIN — CLOBETASOL PROPIONATE: 0.5 CREAM TOPICAL at 00:00

## 2023-08-08 ASSESSMENT — LOCATION SIMPLE DESCRIPTION DERM
LOCATION SIMPLE: RIGHT UPPER BACK
LOCATION SIMPLE: LEFT UPPER ARM
LOCATION SIMPLE: UPPER BACK
LOCATION SIMPLE: RIGHT BREAST
LOCATION SIMPLE: RIGHT UPPER ARM
LOCATION SIMPLE: RIGHT POPLITEAL SKIN

## 2023-08-08 ASSESSMENT — LOCATION DETAILED DESCRIPTION DERM
LOCATION DETAILED: RIGHT MEDIAL UPPER BACK
LOCATION DETAILED: RIGHT MEDIAL BREAST 3-4:00 REGION
LOCATION DETAILED: SUPERIOR THORACIC SPINE
LOCATION DETAILED: LEFT ANTECUBITAL SKIN
LOCATION DETAILED: RIGHT ANTECUBITAL SKIN
LOCATION DETAILED: RIGHT MEDIAL POPLITEAL SKIN

## 2023-08-08 ASSESSMENT — LOCATION ZONE DERM
LOCATION ZONE: LEG
LOCATION ZONE: TRUNK
LOCATION ZONE: ARM

## 2023-08-08 NOTE — PROCEDURE: COUNSELING
Detail Level: Generalized
Patient Specific Counseling (Will Not Stick From Patient To Patient): - Has been ongoing for 3 years per pt hx, has tried numerous topicals- pt to check at home prior to calling in rx so medications are not repeated
Detail Level: Simple
Detail Level: Detailed

## 2023-08-08 NOTE — PROCEDURE: DIAGNOSIS COMMENT
Render Risk Assessment In Note?: no
Comment: NP, FBSE - Benign findings today. Benign nevi distributed on the body throughout. 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 Prescribed mometasone 0.1 % topical cream for eczema flares on elbows. Sent refill for clobetasol 2% shampoo per Pt request.
Detail Level: Generalized

## 2024-08-09 ENCOUNTER — APPOINTMENT (RX ONLY)
Dept: URBAN - METROPOLITAN AREA CLINIC 151 | Facility: CLINIC | Age: 44
Setting detail: DERMATOLOGY
End: 2024-08-09

## 2024-08-09 DIAGNOSIS — L72.0 EPIDERMAL CYST: ICD-10-CM

## 2024-08-09 DIAGNOSIS — L20.89 OTHER ATOPIC DERMATITIS: ICD-10-CM

## 2024-08-09 DIAGNOSIS — L82.1 OTHER SEBORRHEIC KERATOSIS: ICD-10-CM

## 2024-08-09 DIAGNOSIS — D22 MELANOCYTIC NEVI: ICD-10-CM

## 2024-08-09 DIAGNOSIS — D18.0 HEMANGIOMA: ICD-10-CM

## 2024-08-09 DIAGNOSIS — R20.2 PARESTHESIA OF SKIN: ICD-10-CM

## 2024-08-09 DIAGNOSIS — L81.4 OTHER MELANIN HYPERPIGMENTATION: ICD-10-CM

## 2024-08-09 PROBLEM — D18.01 HEMANGIOMA OF SKIN AND SUBCUTANEOUS TISSUE: Status: ACTIVE | Noted: 2024-08-09

## 2024-08-09 PROBLEM — D22.5 MELANOCYTIC NEVI OF TRUNK: Status: ACTIVE | Noted: 2024-08-09

## 2024-08-09 PROBLEM — D22.71 MELANOCYTIC NEVI OF RIGHT LOWER LIMB, INCLUDING HIP: Status: ACTIVE | Noted: 2024-08-09

## 2024-08-09 PROBLEM — D23.5 OTHER BENIGN NEOPLASM OF SKIN OF TRUNK: Status: ACTIVE | Noted: 2024-08-09

## 2024-08-09 PROCEDURE — G2211 COMPLEX E/M VISIT ADD ON: HCPCS

## 2024-08-09 PROCEDURE — ? PRESCRIPTION MEDICATION MANAGEMENT

## 2024-08-09 PROCEDURE — ? COUNSELING

## 2024-08-09 PROCEDURE — ? OBSERVATION AND MEASURE

## 2024-08-09 PROCEDURE — 99214 OFFICE O/P EST MOD 30 MIN: CPT

## 2024-08-09 PROCEDURE — ? DIAGNOSIS COMMENT

## 2024-08-09 PROCEDURE — ? VISIT COMPLEXITY

## 2024-08-09 ASSESSMENT — LOCATION ZONE DERM
LOCATION ZONE: ARM
LOCATION ZONE: EYELID
LOCATION ZONE: TRUNK
LOCATION ZONE: LEG

## 2024-08-09 ASSESSMENT — LOCATION DETAILED DESCRIPTION DERM
LOCATION DETAILED: RIGHT ANTECUBITAL SKIN
LOCATION DETAILED: RIGHT LATERAL INFERIOR EYELID
LOCATION DETAILED: LEFT ANTECUBITAL SKIN
LOCATION DETAILED: RIGHT MEDIAL POPLITEAL SKIN
LOCATION DETAILED: RIGHT MEDIAL BREAST 3-4:00 REGION
LOCATION DETAILED: SUPERIOR THORACIC SPINE

## 2024-08-09 ASSESSMENT — LOCATION SIMPLE DESCRIPTION DERM
LOCATION SIMPLE: RIGHT BREAST
LOCATION SIMPLE: LEFT UPPER ARM
LOCATION SIMPLE: RIGHT INFERIOR EYELID
LOCATION SIMPLE: UPPER BACK
LOCATION SIMPLE: RIGHT UPPER ARM
LOCATION SIMPLE: RIGHT POPLITEAL SKIN

## 2024-08-09 NOTE — PROCEDURE: PRESCRIPTION MEDICATION MANAGEMENT
Detail Level: Zone
Render In Strict Bullet Format?: No
Continue Regimen: Clobetasol cream as prescribed by Dr. Avila

## 2024-08-09 NOTE — PROCEDURE: DIAGNOSIS COMMENT
Render Risk Assessment In Note?: no
Comment: FBSE - Benign findings today. Benign nevi distributed on the body throughout. Pt states she is still experiencing itchiness from notalgia paresthetica. For milia on RT lower eyelash line as per HPI, counseled pt on treatment options such as referring to occuloplastic for removal. Pt deferred today. Eczema on RT and LT antecubital skin has improved. If necessary will use clobetasol as prescribed by Dr. Avila. \\nFU 1 year.
Detail Level: Generalized

## 2024-08-09 NOTE — PROCEDURE: COUNSELING
Detail Level: Generalized
Patient Specific Counseling (Will Not Stick From Patient To Patient): - Has been ongoing for 3 years per pt hx, has tried numerous topicals- pt to check at home prior to calling in rx so medications are not repeated
Detail Level: Simple
Detail Level: Detailed
General

## 2025-02-06 ENCOUNTER — APPOINTMENT (OUTPATIENT)
Dept: URBAN - METROPOLITAN AREA CLINIC 151 | Facility: CLINIC | Age: 45
Setting detail: DERMATOLOGY
End: 2025-02-06

## 2025-02-06 DIAGNOSIS — S31000A OPEN WOUND(S) (MULTIPLE) OF UNSPECIFIED SITE(S), WITHOUT MENTION OF COMPLICATION: ICD-10-CM

## 2025-02-06 DIAGNOSIS — L21.8 OTHER SEBORRHEIC DERMATITIS: ICD-10-CM | Status: INADEQUATELY CONTROLLED

## 2025-02-06 DIAGNOSIS — L259 CONTACT DERMATITIS AND OTHER ECZEMA, UNSPECIFIED CAUSE: ICD-10-CM

## 2025-02-06 PROBLEM — T07.XXXA UNSPECIFIED MULTIPLE INJURIES, INITIAL ENCOUNTER: Status: ACTIVE | Noted: 2025-02-06

## 2025-02-06 PROBLEM — L23.9 ALLERGIC CONTACT DERMATITIS, UNSPECIFIED CAUSE: Status: ACTIVE | Noted: 2025-02-06

## 2025-02-06 PROCEDURE — ? PRESCRIPTION MEDICATION MANAGEMENT

## 2025-02-06 PROCEDURE — ? PRESCRIPTION

## 2025-02-06 PROCEDURE — ? COUNSELING

## 2025-02-06 PROCEDURE — ? DIAGNOSIS COMMENT

## 2025-02-06 PROCEDURE — 99214 OFFICE O/P EST MOD 30 MIN: CPT

## 2025-02-06 RX ORDER — FLUOCINOLONE ACETONIDE 0.11 MG/ML
OIL AURICULAR (OTIC)
Qty: 20 | Refills: 2 | Status: ERX | COMMUNITY
Start: 2025-02-06

## 2025-02-06 RX ORDER — MUPIROCIN 20 MG/G
OINTMENT TOPICAL
Qty: 15 | Refills: 2 | Status: ERX | COMMUNITY
Start: 2025-02-06

## 2025-02-06 RX ADMIN — MUPIROCIN: 20 OINTMENT TOPICAL at 00:00

## 2025-02-06 RX ADMIN — FLUOCINOLONE ACETONIDE: 0.11 OIL AURICULAR (OTIC) at 00:00

## 2025-02-06 ASSESSMENT — LOCATION SIMPLE DESCRIPTION DERM: LOCATION SIMPLE: LEFT EAR

## 2025-02-06 ASSESSMENT — LOCATION DETAILED DESCRIPTION DERM: LOCATION DETAILED: LEFT CAVUM CONCHA

## 2025-02-06 ASSESSMENT — LOCATION ZONE DERM: LOCATION ZONE: EAR

## 2025-02-06 NOTE — PROCEDURE: DIAGNOSIS COMMENT
Comment: Discussed flakiness in LT ear is most likely seborrheic dermatitis. Prescribed Dermotic oil ear drops for pt to try. Also discussed applying aquaphor to area. For wound on LT 4th finger after cut from glass, prescribed mupirocin. For nail after using stick-on nails, discussed could be due to allergy from adhesive. Pt to see if resolves itself but will consider fungal culture in the future. For eyelids, discussed lines will continue to resolve itself. Counseled pt on applying tacrolimus once daily to area as prescribed by Dr. Avila.  For anti-aging regime, counseled pt on waiting due to numerous allergies after patch test.
Render Risk Assessment In Note?: no
Detail Level: Generalized

## 2025-02-06 NOTE — HPI: OTHER
Condition:: Multiple areas of concern
Please Describe Your Condition:: 1. Flakiness on LT ear. 2. Nail trauma from using stick on nails. 3. 4th LT fingernail after cut from glass. 4. Anti-aging regime. 5. Eyelid wrinkles after using tacrolimus for eyelids.

## 2025-02-06 NOTE — PROCEDURE: PRESCRIPTION MEDICATION MANAGEMENT
Initiate Treatment: DermOtic Oil 0.01 % ear drops
Render In Strict Bullet Format?: No
Detail Level: Zone
Initiate Treatment: mupirocin 2 % topical ointment

## 2025-08-12 ENCOUNTER — APPOINTMENT (OUTPATIENT)
Dept: URBAN - METROPOLITAN AREA CLINIC 151 | Facility: CLINIC | Age: 45
Setting detail: DERMATOLOGY
End: 2025-08-12

## 2025-08-12 DIAGNOSIS — D18.0 HEMANGIOMA: ICD-10-CM

## 2025-08-12 DIAGNOSIS — D22 MELANOCYTIC NEVI: ICD-10-CM

## 2025-08-12 DIAGNOSIS — L81.4 OTHER MELANIN HYPERPIGMENTATION: ICD-10-CM

## 2025-08-12 DIAGNOSIS — L90.0 LICHEN SCLEROSUS ET ATROPHICUS: ICD-10-CM

## 2025-08-12 DIAGNOSIS — L82.1 OTHER SEBORRHEIC KERATOSIS: ICD-10-CM

## 2025-08-12 DIAGNOSIS — B35.1 TINEA UNGUIUM: ICD-10-CM

## 2025-08-12 DIAGNOSIS — L72.0 EPIDERMAL CYST: ICD-10-CM

## 2025-08-12 DIAGNOSIS — R20.2 PARESTHESIA OF SKIN: ICD-10-CM

## 2025-08-12 DIAGNOSIS — L20.89 OTHER ATOPIC DERMATITIS: ICD-10-CM

## 2025-08-12 PROBLEM — D22.71 MELANOCYTIC NEVI OF RIGHT LOWER LIMB, INCLUDING HIP: Status: ACTIVE | Noted: 2025-08-12

## 2025-08-12 PROBLEM — D22.5 MELANOCYTIC NEVI OF TRUNK: Status: ACTIVE | Noted: 2025-08-12

## 2025-08-12 PROBLEM — D23.5 OTHER BENIGN NEOPLASM OF SKIN OF TRUNK: Status: ACTIVE | Noted: 2025-08-12

## 2025-08-12 PROBLEM — D18.01 HEMANGIOMA OF SKIN AND SUBCUTANEOUS TISSUE: Status: ACTIVE | Noted: 2025-08-12

## 2025-08-12 PROCEDURE — ? PRESCRIPTION MEDICATION MANAGEMENT

## 2025-08-12 PROCEDURE — ? NAIL CLIPPING FOR PATHOLOGY

## 2025-08-12 PROCEDURE — ? COUNSELING

## 2025-08-12 PROCEDURE — ? DIAGNOSIS COMMENT

## 2025-08-12 PROCEDURE — ? OBSERVATION

## 2025-08-12 PROCEDURE — ? VISIT COMPLEXITY

## 2025-08-12 ASSESSMENT — LOCATION ZONE DERM
LOCATION ZONE: EYELID
LOCATION ZONE: FINGERNAIL
LOCATION ZONE: LEG
LOCATION ZONE: ARM
LOCATION ZONE: TRUNK

## 2025-08-12 ASSESSMENT — LOCATION DETAILED DESCRIPTION DERM
LOCATION DETAILED: LEFT ANTECUBITAL SKIN
LOCATION DETAILED: RIGHT MEDIAL POPLITEAL SKIN
LOCATION DETAILED: RIGHT LATERAL INFERIOR EYELID
LOCATION DETAILED: RIGHT ANTECUBITAL SKIN
LOCATION DETAILED: RIGHT SMALL FINGERNAIL
LOCATION DETAILED: SUPERIOR THORACIC SPINE
LOCATION DETAILED: RIGHT MEDIAL BREAST 3-4:00 REGION

## 2025-08-12 ASSESSMENT — LOCATION SIMPLE DESCRIPTION DERM
LOCATION SIMPLE: UPPER BACK
LOCATION SIMPLE: RIGHT SMALL FINGERNAIL
LOCATION SIMPLE: LEFT UPPER ARM
LOCATION SIMPLE: RIGHT UPPER ARM
LOCATION SIMPLE: RIGHT INFERIOR EYELID
LOCATION SIMPLE: RIGHT POPLITEAL SKIN
LOCATION SIMPLE: RIGHT BREAST